# Patient Record
(demographics unavailable — no encounter records)

---

## 2025-01-13 NOTE — HISTORY OF PRESENT ILLNESS
August 21, 2017      MARC Bean,FNP-C  [address] [address] [address]           [facility] - Allergy/ Immunology  [address]  [place] [address]  Phone: [phone]  Fax: [phone]          Patient: Dipti Heredia   MR Number: [id number]   YOB: 1956   Date of Visit: 8/21/2017       Dear Allie Galicia:    Thank you for referring Dipti Heredia to me for evaluation. Attached you will find relevant portions of my assessment and plan of care.    If you have questions, please do not hesitate to call me. I look forward to following Dipti Heredia along with you.    Sincerely,    Armen Manning MD    Enclosure  CC:  No Recipients    If you would like to receive this communication electronically, please contact [email or web address] or [phone] to request more information on [employer] access.    For providers and/or their staff who would like to refer a patient to [facility], please contact us through our one-stop-shop provider referral line, [facility], at [phone].    If you feel you have received this communication in error or would no longer like to receive these types of communications, please e-mail [email or web address]          [de-identified] : REASON: RIGHT ELBOW PAIN  DURATION: PAIN STARTED  NOVEMBER 2024- MIGHT BE FROM LIFTING  PT HAD BRUISING AROUND 11/16/2024  PT WENT TO [facility], WRAPPED PT ARM AND TOLD PT IT WAS BURSITIS  PAIN LEVEL:6-7/10   INTERMITTENT PRIOR TREATMENTS: RESTING, ICE, HEAT, IBUPROFEN AGGRAVATING FACTORS: LIFTING, LYING DOWN, DAILY ACTIVITES   SYMPTOMS: SWELLING ASSOCIATED POPPING / CLICKING PT HAD ACCUPUNMCTURE ONE WEEK AGO - SOME RELIEF      PREVIOUS INJECTIONS: NO  PREVIOUS PHYSCIAL THERPAY: NO  PREVIOUS SURGERIES:NO

## 2025-01-13 NOTE — HISTORY OF PRESENT ILLNESS
[de-identified] : REASON: RIGHT ELBOW PAIN  DURATION: PAIN STARTED  NOVEMBER 2024- MIGHT BE FROM LIFTING  PT HAD BRUISING AROUND 11/16/2024  PT WENT TO [facility], WRAPPED PT ARM AND TOLD PT IT WAS BURSITIS  PAIN LEVEL:6-7/10   INTERMITTENT PRIOR TREATMENTS: RESTING, ICE, HEAT, IBUPROFEN AGGRAVATING FACTORS: LIFTING, LYING DOWN, DAILY ACTIVITES   SYMPTOMS: SWELLING ASSOCIATED POPPING / CLICKING PT HAD ACCUPUNMCTURE ONE WEEK AGO - SOME RELIEF      PREVIOUS INJECTIONS: NO  PREVIOUS PHYSCIAL THERPAY: NO  PREVIOUS SURGERIES:NO

## 2025-01-13 NOTE — DISCUSSION/SUMMARY
[de-identified] : UNCLEAR ETIOLOGY PATIENT HAS RIGHT ELBOW SWELLING AND REDNESS.  PATIENT REPORTS NONTRAUMATIC ONSET OF ECCHYMOSIS IN THE UPPER ARM AND ELBOW MAY BE FROM LIFTING AT WORK.  BRUISING IN MID NOVEMBER HAS STARTED TO RESOLVE NOW WITH RESIDUAL SWELLING AND REDNESS  PATIENT DENIES FEVER SHAKES OR CHILLS WAS SEEN IN URGENT CARE TREATED WITH AN ANTI-INFLAMMATORY MEDICATION PATIENT NOW HAS LOCALIZED SWELLING WITHOUT OBVIOUS.  FLUID ACCUMULATION IN THE OLECRANON BURSA.  SURROUNDING ERYTHEMA SLIGHT REDNESS WITH NO SIGNIFICANT WARMTH  I PRESCRIBED METHYLPREDNISOLONE DOSEPAK AS IT ANTI-INFLAMMATORY FOR 6 DAYS  I PRESCRIBED CEPHALEXIN THIS ANTIBIOTIC FOR CELLULITIS/BURSITIS FOR 10 DAYS  OFFICE VISIT IN 10 DAYS TO EVALUATE RESOLUTION

## 2025-01-13 NOTE — DISCUSSION/SUMMARY
[de-identified] : UNCLEAR ETIOLOGY PATIENT HAS RIGHT ELBOW SWELLING AND REDNESS.  PATIENT REPORTS NONTRAUMATIC ONSET OF ECCHYMOSIS IN THE UPPER ARM AND ELBOW MAY BE FROM LIFTING AT WORK.  BRUISING IN MID NOVEMBER HAS STARTED TO RESOLVE NOW WITH RESIDUAL SWELLING AND REDNESS  PATIENT DENIES FEVER SHAKES OR CHILLS WAS SEEN IN URGENT CARE TREATED WITH AN ANTI-INFLAMMATORY MEDICATION PATIENT NOW HAS LOCALIZED SWELLING WITHOUT OBVIOUS.  FLUID ACCUMULATION IN THE OLECRANON BURSA.  SURROUNDING ERYTHEMA SLIGHT REDNESS WITH NO SIGNIFICANT WARMTH  I PRESCRIBED METHYLPREDNISOLONE DOSEPAK AS IT ANTI-INFLAMMATORY FOR 6 DAYS  I PRESCRIBED CEPHALEXIN THIS ANTIBIOTIC FOR CELLULITIS/BURSITIS FOR 10 DAYS  OFFICE VISIT IN 10 DAYS TO EVALUATE RESOLUTION

## 2025-01-13 NOTE — PHYSICAL EXAM
[de-identified] : PHYSICAL EXAM RIGHT ELBOW  NO OBVIOUS DEFORMITY OR ANGULATION  SWELLING AND REDNESS IN OLECRANON BURSA  AROM  FLEXION / EXTENSION - WNL SUPINATION / PRONATION - WNL  NO VARUS / VALGUS INSTABILITY   TENDER AT OLECRAN[place] BUR[place] LIMITED SWELLING AND REDNESS   MOTOR STRENGTH - GROSSLY NORMAL SENSATION - GROSSLY NORMAL [de-identified] : XRAY RIGHT ELBOW (2VIEWS) NO OBVIOUS FRACTURE ,  DISLOCATION  JOINT SPACES AND ALIGNMENT ARE NORMAL  GRADE 1 OSTEOARTHRITIS,

## 2025-01-13 NOTE — PHYSICAL EXAM
[de-identified] : PHYSICAL EXAM RIGHT ELBOW  NO OBVIOUS DEFORMITY OR ANGULATION  SWELLING AND REDNESS IN OLECRANON BURSA  AROM  FLEXION / EXTENSION - WNL SUPINATION / PRONATION - WNL  NO VARUS / VALGUS INSTABILITY   TENDER AT OLECRAN[place] BUR[place] LIMITED SWELLING AND REDNESS   MOTOR STRENGTH - GROSSLY NORMAL SENSATION - GROSSLY NORMAL [de-identified] : XRAY RIGHT ELBOW (2VIEWS) NO OBVIOUS FRACTURE ,  DISLOCATION  JOINT SPACES AND ALIGNMENT ARE NORMAL  GRADE 1 OSTEOARTHRITIS,

## 2025-01-23 NOTE — PHYSICAL EXAM
[de-identified] : PHYSICAL EXAM RIGHT ELBOW  NO OBVIOUS DEFORMITY OR ANGULATION  DECREASED SWELLING AND REDNESS IN OLECRANON BURSA  AROM  FLEXION / EXTENSION - WNL SUPINATION / PRONATION - WNL  NO VARUS / VALGUS INSTABILITY   TENDER AT OLECRAN[place] BUR[place] LIMITED SWELLING AND REDNESS   MOTOR STRENGTH - GROSSLY NORMAL SENSATION - GROSSLY NORMAL

## 2025-01-23 NOTE — HISTORY OF PRESENT ILLNESS
[de-identified] : RIGHT ELBOW PAIN AND REDNESS FOLLOW UP  PAIN LEVEL: 6/10  PAIN HAS IMPROVED  PT IS TAKING CEPHALEXIN 10 DAYS      REASON: RIGHT ELBOW PAIN  DURATION: PAIN STARTED  NOVEMBER 2024- MIGHT BE FROM LIFTING  PT HAD BRUISING AROUND 11/16/2024  PT WENT TO [facility], WRAPPED PT ARM AND TOLD PT IT WAS BURSITIS  PAIN LEVEL:6-7/10   INTERMITTENT PRIOR TREATMENTS: RESTING, ICE, HEAT, IBUPROFEN AGGRAVATING FACTORS: LIFTING, LYING DOWN, DAILY ACTIVITES   SYMPTOMS: SWELLING ASSOCIATED POPPING / CLICKING PT HAD ACCUPUNMCTURE ONE WEEK AGO - SOME RELIEF      PREVIOUS INJECTIONS: NO  PREVIOUS PHYSCIAL THERPAY: NO  PREVIOUS SURGERIES:NO     REASON: RIGHT ELBOW PAIN  DURATION: PAIN STARTED  NOVEMBER 2024- MIGHT BE FROM LIFTING  PT HAD BRUISING AROUND 11/16/2024  PT WENT TO [facility], WRAPPED PT ARM AND TOLD PT IT WAS BURSITIS  PAIN LEVEL:6-7/10   INTERMITTENT PRIOR TREATMENTS: RESTING, ICE, HEAT, IBUPROFEN AGGRAVATING FACTORS: LIFTING, LYING DOWN, DAILY ACTIVITES   SYMPTOMS: SWELLING ASSOCIATED POPPING / CLICKING PT HAD ACCUPUNMCTURE ONE WEEK AGO - SOME RELIEF      PREVIOUS INJECTIONS: NO  PREVIOUS PHYSCIAL THERPAY: NO  PREVIOUS SURGERIES:NO

## 2025-01-23 NOTE — DISCUSSION/SUMMARY
[de-identified] : MUCH IMPROVED  - NO FEVERS SHAKES CHILLS  COMPLETE 14 DAYS KEFLEX  THEN RETURN TO NORMAL ACTIVITIES